# Patient Record
Sex: FEMALE | Race: WHITE | NOT HISPANIC OR LATINO | Employment: OTHER | URBAN - METROPOLITAN AREA
[De-identification: names, ages, dates, MRNs, and addresses within clinical notes are randomized per-mention and may not be internally consistent; named-entity substitution may affect disease eponyms.]

---

## 2017-01-24 ENCOUNTER — OFFICE VISIT (OUTPATIENT)
Dept: OBSTETRICS AND GYNECOLOGY | Facility: CLINIC | Age: 57
End: 2017-01-24
Payer: MEDICARE

## 2017-01-24 VITALS
SYSTOLIC BLOOD PRESSURE: 142 MMHG | DIASTOLIC BLOOD PRESSURE: 98 MMHG | BODY MASS INDEX: 44.41 KG/M2 | WEIGHT: 293 LBS | HEIGHT: 68 IN

## 2017-01-24 DIAGNOSIS — N95.0 POST-MENOPAUSAL BLEEDING: Primary | ICD-10-CM

## 2017-01-24 PROCEDURE — 99499 UNLISTED E&M SERVICE: CPT | Mod: S$GLB,,, | Performed by: OBSTETRICS & GYNECOLOGY

## 2017-01-24 PROCEDURE — 88305 TISSUE EXAM BY PATHOLOGIST: CPT | Mod: 26,,, | Performed by: PATHOLOGY

## 2017-01-24 PROCEDURE — 99999 PR PBB SHADOW E&M-EST. PATIENT-LVL III: CPT | Mod: PBBFAC,,, | Performed by: OBSTETRICS & GYNECOLOGY

## 2017-01-24 PROCEDURE — 88305 TISSUE EXAM BY PATHOLOGIST: CPT | Performed by: PATHOLOGY

## 2017-01-24 PROCEDURE — 58100 BIOPSY OF UTERUS LINING: CPT | Mod: S$GLB,,, | Performed by: OBSTETRICS & GYNECOLOGY

## 2017-01-24 RX ORDER — ALBUTEROL SULFATE 90 UG/1
2 AEROSOL, METERED RESPIRATORY (INHALATION)
COMMUNITY
Start: 2016-10-26

## 2017-01-24 RX ORDER — LUBIPROSTONE 8 UG/1
CAPSULE ORAL
COMMUNITY
Start: 2016-12-29

## 2017-01-24 RX ORDER — OXYCODONE AND ACETAMINOPHEN 10; 325 MG/1; MG/1
1 TABLET ORAL
COMMUNITY
Start: 2015-02-20

## 2017-01-24 RX ORDER — LEVOTHYROXINE SODIUM 125 UG/1
125 TABLET ORAL
COMMUNITY
Start: 2017-01-17

## 2017-01-24 RX ORDER — MORPHINE SULFATE 15 MG/1
15 TABLET, FILM COATED, EXTENDED RELEASE ORAL
COMMUNITY
End: 2021-08-05

## 2017-01-24 NOTE — PROGRESS NOTES
"Subjective:       Patient ID: Saida Guzman is a 56 y.o. female.    Chief Complaint:  Follow-up      History of Present Illness  HPI  here for f/u   Hx of sono 2016--ut 11x5x8; lining thickened 1.8 cm; had hysteroscopy with myosure 2016--showing large endometrial polyp with complex hyperplasia with atypia--atrophic endometrium other than polyp  Had no further bleeding until 2016  Unable to hold full bladder in order to do pelvic sono  Still with intermittent bleeding  "spots" --not really a flow--usually a few times in a day but not multiple days in a row  Can be 2-3 times /month  Agreeable to doing emb today    GYN & OB History  No LMP recorded. Patient is postmenopausal.   Date of Last Pap: No result found    OB History    Para Term  AB SAB TAB Ectopic Multiple Living   2 2 2             # Outcome Date GA Lbr Devonte/2nd Weight Sex Delivery Anes PTL Lv   2 Term      Vag-Spont      1 Term      Vag-Spont             Review of Systems  Review of Systems   Constitutional: Negative for activity change, appetite change, chills, diaphoresis, fatigue, fever and unexpected weight change.   HENT: Negative for mouth sores and tinnitus.    Eyes: Negative for discharge and visual disturbance.   Respiratory: Negative for cough, shortness of breath and wheezing.    Cardiovascular: Negative for chest pain, palpitations and leg swelling.   Gastrointestinal: Negative for abdominal pain, bloating, blood in stool, constipation, diarrhea, nausea and vomiting.   Endocrine: Negative for diabetes, hair loss, hot flashes, hyperthyroidism and hypothyroidism.   Genitourinary: Positive for postmenopausal bleeding. Negative for decreased libido, dyspareunia, dysuria, flank pain, frequency, genital sores, hematuria, menorrhagia, menstrual problem, pelvic pain, urgency, vaginal bleeding, vaginal discharge, vaginal pain, dysmenorrhea, urinary incontinence, postcoital bleeding and vaginal odor.   Musculoskeletal: Negative " for back pain and myalgias.   Skin:  Negative for rash, no acne and hair changes.   Neurological: Negative for seizures, syncope, numbness and headaches.   Hematological: Negative for adenopathy. Does not bruise/bleed easily.   Psychiatric/Behavioral: Negative for depression and sleep disturbance. The patient is not nervous/anxious.    Breast: Negative for breast mass, breast pain, nipple discharge and skin changes          Objective:    Physical Exam:   Constitutional: She appears well-developed.     Eyes: Conjunctivae and EOM are normal. Pupils are equal, round, and reactive to light.    Neck: Normal range of motion. Neck supple.     Pulmonary/Chest: Effort normal. Right breast exhibits no mass, no nipple discharge, no skin change, no tenderness and presence. Left breast exhibits no mass, no nipple discharge, no skin change, no tenderness and presence. Breasts are symmetrical.        Abdominal: Soft.     Genitourinary: Vagina normal and uterus normal. Pelvic exam was performed with patient supine. Cervix is normal. Right adnexum displays no mass. Left adnexum displays no mass.        Uterus Size: 10 cm   Musculoskeletal: Normal range of motion.       Neurological: She is alert.    Skin: Skin is warm.    Psychiatric: She has a normal mood and affect.          Assessment:   Post menopausal bleeding          Plan:      emb--see procedure note

## 2017-01-25 NOTE — PROCEDURES
Biopsy (Gynecological)  Date/Time: 2017 3:00 PM  Performed by: STEWART AMAYA.  Authorized by: STEWART AMAYA     Consent Done?:  Yes (Verbal)   Patient was prepped and draped in the normal sterile fashion.  Local anesthesia used?: No      Biopsy Location:  Uterus  Estimated blood loss (cc):  5   Patient tolerated the procedure well with no immediate complications.     CC: ENDOMETRIAL BIOPSPY    Saida Guzman is a 56 y.o. female  presents for an endometrial biopsy secondary to post menopausal bleeding  UPT was not indicated due to age    PRE-ENDOMETRIAL BIOPSY COUNSELING:    The patient was informed of the risk of bleeding, infection, uterine perforation and pain and that the test will rule-out endometrial cancer with accuracy greater than 95%.  She was counseled on the alternatives to endometrial biopsy and agrees to proceed.      TIME OUT PERFORMED.    The cervix was visualized with a speculum. Betadine applied x 3.    A single tooth tenaculum was placed on the anterior lip prior to the biopsy.      A sterile endometrial pipelle was passed without difficulty to a depth of 10 cm.    Endometrial tissue was obtained.      The specimen was placed in formalyn and sent to Pathology of histology evaluation.    The patient tolerated the procedure well.      ASSESSMENT AND PLAN  Post-menopausal bleeding  (primary encounter diagnosis)  Plan: Tissue Specimen To Pathology,         Obstetrics/Gynecology      POST ENDOMETRIAL BIOPSY COUNSELING:  Manage post biopsy cramping with NSAIDs or Tylenol.  Expect spotting or light bleeding for a few days.  Report bleeding heavier than a period, fever > 101.0 F, worsening pain or a foul smelling vaginal discharge.      Counseling lasted approximately 15 minutes and all her questions were answered.      FOLLOW-UP:  Pending biopsy

## 2017-01-30 ENCOUNTER — TELEPHONE (OUTPATIENT)
Dept: OBSTETRICS AND GYNECOLOGY | Facility: CLINIC | Age: 57
End: 2017-01-30

## 2017-01-30 DIAGNOSIS — N95.0 POSTMENOPAUSAL BLEEDING: Primary | ICD-10-CM

## 2017-01-30 RX ORDER — MEGESTROL ACETATE 40 MG/1
40 TABLET ORAL DAILY
Qty: 30 TABLET | Refills: 3 | Status: SHIPPED | OUTPATIENT
Start: 2017-01-30 | End: 2019-03-28

## 2017-01-30 NOTE — TELEPHONE ENCOUNTER
Results given to pt--emb--complex hyperplasia without atypia; pt offered megace for 90 d and repeat emb in 90d; pt aware that she may have intermittent bleeding during the 3 months of megace  rx sent to [facility]--susan doe    Please call pt with 3 mo emb appt

## 2017-02-10 ENCOUNTER — TELEPHONE (OUTPATIENT)
Dept: OBSTETRICS AND GYNECOLOGY | Facility: CLINIC | Age: 57
End: 2017-02-10

## 2017-02-10 NOTE — TELEPHONE ENCOUNTER
"Please advise--"good for her!!" She will be evaluated by anesthesia and may see the cardiologist preop  "

## 2017-02-10 NOTE — TELEPHONE ENCOUNTER
----- Message from Julia Holland sent at 2/10/2017 12:05 PM CST -----  Contact: Pt   Pt called and stated she needed to speak to the nurse. She stated that it is very personal and did not discuss details. She can be reached at [phone].    Thanks,  TF

## 2017-02-10 NOTE — TELEPHONE ENCOUNTER
----- Message from Julia Holland sent at 2/10/2017 12:05 PM CST -----  Contact: Pt   Pt called and stated she needed to speak to the nurse. She stated that it is very personal and did not discuss details. She can be reached at [phone].    Thanks,  TF

## 2017-03-06 ENCOUNTER — TELEPHONE (OUTPATIENT)
Dept: OBSTETRICS AND GYNECOLOGY | Facility: CLINIC | Age: 57
End: 2017-03-06

## 2017-03-06 NOTE — TELEPHONE ENCOUNTER
----- Message from Jose Patiño sent at 3/6/2017  1:36 PM CST -----  Contact: pt   States she is calling rg wanting to speak with Dr/ Nurse rg having surgery but is spotting again and can be reached at [phone]//thanks/dbw

## 2017-03-07 NOTE — TELEPHONE ENCOUNTER
Please schedule pt for emb procedure--  Please remind pt to premedicate with nsaid prior to procedure.

## 2017-03-30 ENCOUNTER — PROCEDURE VISIT (OUTPATIENT)
Dept: OBSTETRICS AND GYNECOLOGY | Facility: CLINIC | Age: 57
End: 2017-03-30
Payer: MEDICARE

## 2017-03-30 DIAGNOSIS — N85.01 COMPLEX ENDOMETRIAL HYPERPLASIA WITHOUT ATYPIA: Primary | ICD-10-CM

## 2017-03-30 PROCEDURE — 58100 BIOPSY OF UTERUS LINING: CPT | Mod: S$GLB,,, | Performed by: OBSTETRICS & GYNECOLOGY

## 2017-03-30 PROCEDURE — 88305 TISSUE EXAM BY PATHOLOGIST: CPT | Performed by: PATHOLOGY

## 2017-03-30 PROCEDURE — 88305 TISSUE EXAM BY PATHOLOGIST: CPT | Mod: 26,,, | Performed by: PATHOLOGY

## 2017-03-30 NOTE — PROCEDURES
Procedures   Pt has almost finished 90 days of megace--has occas spotting.  Here for f/u emb  Endometrial Biopsy Procedure Note    Name of Provider: Dr. Yamilka Montes  Procedure Details  Urine pregnancy test not done (post menopausal)  The risks (including infection, bleeding, pain, and uterine perforation) and benefits of the procedure were explained to the patient and Verbal informed consent was obtained.  Time out was performed.    The patient was placed in the dorsal lithotomy position.    A large Graves' speculum inserted in the vagina, and the cervix prepped with povidone iodine.       A sharp tenaculum was applied to the anterior lip of the cervix for stabilization.  A Pipelle endometrial aspirator was used to sound the uterus to a depth of 12.  It was then used to sample the endometrium.  3passes were made.  Sample was sent for pathologic examination.    Condition:  Stable    Complications:  None    Plan:    The patient was advised to call for any fever or for prolonged or severe pain or bleeding. She was advised to use OTC acetaminophen as needed for mild to moderate pain. She was advised to avoid vaginal intercourse for 48 hours or until the bleeding has completely stopped.      Specimen: Endometrial curettings    Post Op Diagnosis: endometrial hyperplasia without atypia

## 2017-04-17 ENCOUNTER — TELEPHONE (OUTPATIENT)
Dept: OBSTETRICS AND GYNECOLOGY | Facility: CLINIC | Age: 57
End: 2017-04-17

## 2017-04-17 DIAGNOSIS — C54.1 ENDOMETRIAL CANCER: Primary | ICD-10-CM

## 2017-04-19 ENCOUNTER — TELEPHONE (OUTPATIENT)
Dept: OBSTETRICS AND GYNECOLOGY | Facility: CLINIC | Age: 57
End: 2017-04-19

## 2017-04-19 NOTE — TELEPHONE ENCOUNTER
Pt states she loves Dr Montes dearly but she is unable to drive to [place] financially. She is inquiring about a local gyn oncology provider. I explained to Michelle Thomas that I will run this by Dr. Montes and get back with her on the next steps. TAM Kelly

## 2017-04-19 NOTE — TELEPHONE ENCOUNTER
----- Message from Stephanie Hughes sent at 4/18/2017  2:19 PM CDT -----  Contact: Patient  Saida, patient [phone], Calling to speak with the nurse or doctor about recent results and her care options. Please advise Thanks.

## 2017-05-01 ENCOUNTER — TELEPHONE (OUTPATIENT)
Dept: OBSTETRICS AND GYNECOLOGY | Facility: CLINIC | Age: 57
End: 2017-05-01

## 2017-05-01 DIAGNOSIS — C54.1 ENDOMETRIAL CANCER: Primary | ICD-10-CM
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
## 2017-05-01 NOTE — TELEPHONE ENCOUNTER
----- Message from Lilliana Palomo LPN sent at 5/1/2017 12:08 PM CDT -----  Contact: [facility]  Dr. Montes:    Can you do an external referral for this patient?  Lilliana  ----- Message -----     From: Tiffanie Mann     Sent: 5/1/2017   9:27 AM       To: Simon Prather Staff    Patient wants a referral to [facility], Please fax to [phone] or call at [phone] ext [phone].    Thanks  Td

## 2017-05-02 ENCOUNTER — TELEPHONE (OUTPATIENT)
Dept: OBSTETRICS AND GYNECOLOGY | Facility: CLINIC | Age: 57
End: 2017-05-02

## 2017-05-02 NOTE — TELEPHONE ENCOUNTER
----- Message from Marquita Muller sent at 5/1/2017 12:05 PM CDT -----  She states that she requested a referral to Dr Mendoza at [facility], but she has not received it.  She wants to know if it has been done.   Call her at [phone].                                   rosenthal

## 2017-05-04 ENCOUNTER — TELEPHONE (OUTPATIENT)
Dept: OBSTETRICS AND GYNECOLOGY | Facility: CLINIC | Age: 57
End: 2017-05-04

## 2017-05-04 NOTE — TELEPHONE ENCOUNTER
Returned call to Pearl with [facility] and was told that she was on the other line. Left message for her to call the office back.

## 2017-05-04 NOTE — TELEPHONE ENCOUNTER
----- Message from Cathleen Mcclain sent at 5/3/2017 10:17 AM CDT -----  Contact: [facility] in [place]   [facility] in [place]  [phone] ask Pearl ,,, calling about a referral has been done for [facility] for Dr. Stark,,,

## 2017-05-23 DIAGNOSIS — N95.0 POSTMENOPAUSAL BLEEDING: ICD-10-CM

## 2017-05-23 RX ORDER — MEGESTROL ACETATE 40 MG/1
TABLET ORAL
Qty: 30 TABLET | Refills: 0 | OUTPATIENT
Start: 2017-05-23

## 2019-03-28 ENCOUNTER — OFFICE VISIT (OUTPATIENT)
Dept: UROLOGY | Facility: CLINIC | Age: 59
End: 2019-03-28
Payer: MEDICARE

## 2019-03-28 VITALS — BODY MASS INDEX: 44.41 KG/M2 | WEIGHT: 293 LBS | HEIGHT: 68 IN

## 2019-03-28 DIAGNOSIS — R32 URINARY INCONTINENCE, UNSPECIFIED TYPE: Primary | ICD-10-CM

## 2019-03-28 DIAGNOSIS — N20.0 RENAL STONE: ICD-10-CM

## 2019-03-28 DIAGNOSIS — K59.00 CONSTIPATION, UNSPECIFIED CONSTIPATION TYPE: ICD-10-CM

## 2019-03-28 LAB
BACTERIA #/AREA URNS AUTO: NORMAL /HPF
BILIRUB SERPL-MCNC: NORMAL MG/DL
BLOOD URINE, POC: NORMAL
COLOR, POC UA: YELLOW
GLUCOSE UR QL STRIP: NORMAL
HYALINE CASTS UR QL AUTO: 0 /LPF
KETONES UR QL STRIP: NORMAL
LEUKOCYTE ESTERASE URINE, POC: NORMAL
MICROSCOPIC COMMENT: NORMAL
NITRITE, POC UA: NORMAL
PH, POC UA: 5
PROTEIN, POC: NORMAL
RBC #/AREA URNS AUTO: 0 /HPF (ref 0–4)
SPECIFIC GRAVITY, POC UA: 1.02
SQUAMOUS #/AREA URNS AUTO: 3 /HPF
UROBILINOGEN, POC UA: NORMAL
WBC #/AREA URNS AUTO: 1 /HPF (ref 0–5)

## 2019-03-28 PROCEDURE — 3008F BODY MASS INDEX DOCD: CPT | Mod: CPTII,S$GLB,, | Performed by: UROLOGY

## 2019-03-28 PROCEDURE — 81002 POCT URINE DIPSTICK WITHOUT MICROSCOPE: ICD-10-PCS | Mod: S$GLB,,, | Performed by: UROLOGY

## 2019-03-28 PROCEDURE — 81002 URINALYSIS NONAUTO W/O SCOPE: CPT | Mod: S$GLB,,, | Performed by: UROLOGY

## 2019-03-28 PROCEDURE — 81001 URINALYSIS AUTO W/SCOPE: CPT

## 2019-03-28 PROCEDURE — 99204 OFFICE O/P NEW MOD 45 MIN: CPT | Mod: 25,S$GLB,, | Performed by: UROLOGY

## 2019-03-28 PROCEDURE — 87086 URINE CULTURE/COLONY COUNT: CPT

## 2019-03-28 PROCEDURE — 99999 PR PBB SHADOW E&M-EST. PATIENT-LVL III: ICD-10-PCS | Mod: PBBFAC,,, | Performed by: UROLOGY

## 2019-03-28 PROCEDURE — 99204 PR OFFICE/OUTPT VISIT, NEW, LEVL IV, 45-59 MIN: ICD-10-PCS | Mod: 25,S$GLB,, | Performed by: UROLOGY

## 2019-03-28 PROCEDURE — 3008F PR BODY MASS INDEX (BMI) DOCUMENTED: ICD-10-PCS | Mod: CPTII,S$GLB,, | Performed by: UROLOGY

## 2019-03-28 PROCEDURE — 99999 PR PBB SHADOW E&M-EST. PATIENT-LVL III: CPT | Mod: PBBFAC,,, | Performed by: UROLOGY

## 2019-03-28 RX ORDER — AMLODIPINE BESYLATE 5 MG/1
5 TABLET ORAL
COMMUNITY
Start: 2019-03-13

## 2019-03-28 RX ORDER — FLUTICASONE PROPIONATE 50 MCG
2 SPRAY, SUSPENSION (ML) NASAL
COMMUNITY
Start: 2019-01-10 | End: 2020-01-10

## 2019-03-28 RX ORDER — NAPROXEN 500 MG/1
500 TABLET ORAL
COMMUNITY
Start: 2018-11-16 | End: 2019-11-16

## 2019-03-28 RX ORDER — TIZANIDINE 2 MG/1
2 TABLET ORAL
COMMUNITY
Start: 2018-09-17
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
## 2019-03-30 LAB — BACTERIA UR CULT: NO GROWTH

## 2019-04-04 ENCOUNTER — TELEPHONE (OUTPATIENT)
Dept: UROLOGY | Facility: CLINIC | Age: 59
End: 2019-04-04

## 2019-04-04 ENCOUNTER — HOSPITAL ENCOUNTER (OUTPATIENT)
Dept: RADIOLOGY | Facility: HOSPITAL | Age: 59
Discharge: HOME | End: 2019-04-04
Attending: UROLOGY
Payer: MEDICARE

## 2019-04-04 DIAGNOSIS — K59.00 CONSTIPATION, UNSPECIFIED CONSTIPATION TYPE: ICD-10-CM

## 2019-04-04 DIAGNOSIS — R32 URINARY INCONTINENCE, UNSPECIFIED TYPE: ICD-10-CM

## 2019-04-04 DIAGNOSIS — N20.0 RENAL STONE: ICD-10-CM

## 2019-04-04 PROCEDURE — 74176 CT ABD & PELVIS W/O CONTRAST: CPT | Mod: TC

## 2019-04-04 NOTE — TELEPHONE ENCOUNTER
----- Message from Senia Epperson sent at 4/4/2019 12:30 PM CDT -----  Contact: Patient  Patient called to speak with the nurse; she stated she just had a CT (from the neck to her hips) done at [facility] about 3 weeks ago. She wants to know if the results from that scan be obtained or if she really has to do the CT that Dr. Stewart scheduled for her today. She can be contacted at [phone].    Thanks,  Senia

## 2019-04-04 NOTE — TELEPHONE ENCOUNTER
Patient states she is scheduled for CT RSS today, however, she had a CT from her neck to her pubic area 3 weeks ago at [facility] and wants to know if she should have the CT RSS today. Patient doesn't know exactly what type of CT scan was preformed. Discussed with Dr. Stewart who recommends patient have CT RSS today as scheduled. Patient notified and states understanding.

## 2019-05-27 ENCOUNTER — OFFICE VISIT (OUTPATIENT)
Dept: UROLOGY | Facility: CLINIC | Age: 59
End: 2019-05-27
Payer: MEDICARE

## 2019-05-27 VITALS
SYSTOLIC BLOOD PRESSURE: 129 MMHG | HEART RATE: 80 BPM | DIASTOLIC BLOOD PRESSURE: 76 MMHG | WEIGHT: 293 LBS | BODY MASS INDEX: 58.63 KG/M2

## 2019-05-27 DIAGNOSIS — N39.41 URGE INCONTINENCE: Primary | ICD-10-CM

## 2019-05-27 DIAGNOSIS — G89.29 CHRONIC BILATERAL LOW BACK PAIN WITHOUT SCIATICA: ICD-10-CM

## 2019-05-27 DIAGNOSIS — Z85.42 HISTORY OF ENDOMETRIAL CANCER: ICD-10-CM

## 2019-05-27 DIAGNOSIS — E66.01 MORBID OBESITY: ICD-10-CM

## 2019-05-27 DIAGNOSIS — M54.50 CHRONIC BILATERAL LOW BACK PAIN WITHOUT SCIATICA: ICD-10-CM

## 2019-05-27 PROCEDURE — 81002 URINALYSIS NONAUTO W/O SCOPE: CPT | Mod: S$GLB,,, | Performed by: UROLOGY

## 2019-05-27 PROCEDURE — 3008F BODY MASS INDEX DOCD: CPT | Mod: CPTII,S$GLB,, | Performed by: UROLOGY

## 2019-05-27 PROCEDURE — 51701 INSERT BLADDER CATHETER: CPT | Mod: S$GLB,,, | Performed by: UROLOGY

## 2019-05-27 PROCEDURE — 51701 PR INSERTION OF NON-INDWELLING BLADDER CATHETERIZATION FOR RESIDUAL UR: ICD-10-PCS | Mod: S$GLB,,, | Performed by: UROLOGY

## 2019-05-27 PROCEDURE — 99215 PR OFFICE/OUTPT VISIT, EST, LEVL V, 40-54 MIN: ICD-10-PCS | Mod: 25,S$GLB,, | Performed by: UROLOGY

## 2019-05-27 PROCEDURE — 99999 PR PBB SHADOW E&M-EST. PATIENT-LVL III: CPT | Mod: PBBFAC,,, | Performed by: UROLOGY

## 2019-05-27 PROCEDURE — 87086 URINE CULTURE/COLONY COUNT: CPT

## 2019-05-27 PROCEDURE — 99999 PR PBB SHADOW E&M-EST. PATIENT-LVL III: ICD-10-PCS | Mod: PBBFAC,,, | Performed by: UROLOGY

## 2019-05-27 PROCEDURE — 99215 OFFICE O/P EST HI 40 MIN: CPT | Mod: 25,S$GLB,, | Performed by: UROLOGY

## 2019-05-27 PROCEDURE — 81002 PR URINALYSIS NONAUTO W/O SCOPE: ICD-10-PCS | Mod: S$GLB,,, | Performed by: UROLOGY

## 2019-05-27 PROCEDURE — 3008F PR BODY MASS INDEX (BMI) DOCUMENTED: ICD-10-PCS | Mod: CPTII,S$GLB,, | Performed by: UROLOGY
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
## 2019-05-28 LAB — BACTERIA UR CULT: NO GROWTH

## 2019-06-12 ENCOUNTER — TELEPHONE (OUTPATIENT)
Dept: UROLOGY | Facility: CLINIC | Age: 59
End: 2019-06-12

## 2019-06-12 NOTE — LETTER
June 12, 2019    Saida Simpson  [address] [place] [address]             [facility] 4th [facility]  [address]  [facility] [address]  Phone: [phone] Dear Mrs. Thomas Simpson:    I received the urodynamic report from Dr. Rascon's office and while it was done well, the infusion rate was rather fast.  In addition, I would like to look in your bladder to be assured that there is no other pathology given that you have had radiation therapy.  I tried calling your number but your message indicated a malfunctioning of your phone.  If you could call my , Edgardo at [phone], she can schedule the appropriate tests.      If you have any questions or concerns, please don't hesitate to call.    Sincerely,        Arely Reynaga MD

## 2019-06-12 NOTE — TELEPHONE ENCOUNTER
The urodynmics were done at a rate of 50 ml/sec infusion.  She was found to have a small volume bladder with urgency at 216 ml.  Given her history of radiation therapy to the pelvis, I would like to do my own FUDS with a cystoscopy.      Tried calling her but the message on her phone stated that her phone is malfunctioning.  She has not set a My Chart account.

## 2019-06-13 ENCOUNTER — TELEPHONE (OUTPATIENT)
Dept: UROLOGY | Facility: CLINIC | Age: 59
End: 2019-06-13

## 2019-06-13 DIAGNOSIS — R32 URINARY INCONTINENCE, UNSPECIFIED TYPE: ICD-10-CM

## 2019-06-13 DIAGNOSIS — N39.41 URGE INCONTINENCE: Primary | ICD-10-CM

## 2019-06-13 NOTE — TELEPHONE ENCOUNTER
----- Message from Cathleen Boothe LPN sent at 6/13/2019  9:36 AM CDT -----  Contact: pt   Pt calling for FUDS/cysto. This is the one Juhi sent letter to on yesterday.    ----- Message -----  From: Rafaela Garcia  Sent: 6/13/2019   9:20 AM  To: Juhi Mcgee Staff    Patient Returning Call from [facility]    Who Left Message for Patient: Dr. Reynaga  Communication Preference: [phone]  Additional Information:

## 2019-06-13 NOTE — TELEPHONE ENCOUNTER
Spoke to pt. FUDS cysto scheduled for 7/16. Appt letter mailed to pt. Pt verbalized understanding.

## 2019-07-15 ENCOUNTER — TELEPHONE (OUTPATIENT)
Dept: UROLOGY | Facility: CLINIC | Age: 59
End: 2019-07-15

## 2019-07-16 ENCOUNTER — HOSPITAL ENCOUNTER (OUTPATIENT)
Facility: HOSPITAL | Age: 59
Discharge: HOME | End: 2019-07-16
Attending: UROLOGY | Admitting: UROLOGY
Payer: MEDICARE

## 2019-07-16 VITALS
TEMPERATURE: 98 F | SYSTOLIC BLOOD PRESSURE: 120 MMHG | DIASTOLIC BLOOD PRESSURE: 79 MMHG | HEART RATE: 77 BPM | WEIGHT: 293 LBS | HEIGHT: 68 IN | BODY MASS INDEX: 44.41 KG/M2 | OXYGEN SATURATION: 96 % | RESPIRATION RATE: 20 BRPM

## 2019-07-16 DIAGNOSIS — N39.498 OTHER URINARY INCONTINENCE: ICD-10-CM

## 2019-07-16 PROBLEM — R32 ABSENCE OF BLADDER CONTINENCE: Status: ACTIVE | Noted: 2019-07-16

## 2019-07-16 PROCEDURE — 27200973 HC CYSTO SUPPLY IV (URODYNAMICS): Performed by: UROLOGY

## 2019-07-16 PROCEDURE — 36000704 HC OR TIME LEV I 1ST 15 MIN: Performed by: UROLOGY

## 2019-07-16 PROCEDURE — 51728 CYSTOMETROGRAM W/VP: CPT | Mod: 26,,, | Performed by: UROLOGY

## 2019-07-16 PROCEDURE — 51784 ANAL/URINARY MUSCLE STUDY: CPT | Mod: 26,51,, | Performed by: UROLOGY

## 2019-07-16 PROCEDURE — 76000 FLUOROSCOPY <1 HR PHYS/QHP: CPT | Mod: 26,,, | Performed by: UROLOGY

## 2019-07-16 PROCEDURE — 76000 PR  FLUOROSCOPE EXAMINATION: ICD-10-PCS | Mod: 26,,, | Performed by: UROLOGY

## 2019-07-16 PROCEDURE — 36000705 HC OR TIME LEV I EA ADD 15 MIN: Performed by: UROLOGY

## 2019-07-16 PROCEDURE — 51784 PR ANAL/URINARY MUSCLE STUDY: ICD-10-PCS | Mod: 26,51,, | Performed by: UROLOGY

## 2019-07-16 PROCEDURE — 51741 PR UROFLOWMETRY, COMPLEX: ICD-10-PCS | Mod: 26,51,, | Performed by: UROLOGY

## 2019-07-16 PROCEDURE — 51797 PR VOIDING PRESS STUDY INTRA-ABDOMINAL VOID: ICD-10-PCS | Mod: 26,,, | Performed by: UROLOGY

## 2019-07-16 PROCEDURE — 51728 PR COMPLEX CYSTOMETROGRAM VOIDING PRESSURE STUDIES: ICD-10-PCS | Mod: 26,,, | Performed by: UROLOGY

## 2019-07-16 PROCEDURE — 51797 INTRAABDOMINAL PRESSURE TEST: CPT | Mod: 26,,, | Performed by: UROLOGY

## 2019-07-16 PROCEDURE — 51741 ELECTRO-UROFLOWMETRY FIRST: CPT | Mod: 26,51,, | Performed by: UROLOGY
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
## 2019-07-16 NOTE — SUBJECTIVE & OBJECTIVE
Past Medical History:   Diagnosis Date    Back pain     Endometrial cancer     Hypertension        Past Surgical History:   Procedure Laterality Date    BREAST SURGERY      RADICAL HYSTERECTOMY      TUBAL LIGATION         Review of patient's allergies indicates:   Allergen Reactions    Adhesive Itching     Pt reports the adhesive on the fentanyl patch caused severe itching; pt states not allergic to any other type of fentanyl routes    Vancomycin analogues Rash       Family History     None          Tobacco Use    Smoking status: Current Every Day Smoker     Packs/day: 0.50   Substance and Sexual Activity    Alcohol use: No    Drug use: No    Sexual activity: Never       Review of Systems   Constitutional: Negative.    HENT: Negative.    Eyes: Negative.    Respiratory: Negative.    Cardiovascular: Negative.    Gastrointestinal: Negative.    Genitourinary: Positive for frequency and urgency.        Urge incontinence   Musculoskeletal: Positive for back pain.   Skin: Negative.    Neurological: Negative.    Psychiatric/Behavioral: Negative.        Objective:     Temp:  [98.2 °F (36.8 °C)] 98.2 °F (36.8 °C)  Pulse:  [77] 77  Resp:  [20] 20  SpO2:  [96 %] 96 %  BP: (120)/(79) 120/79     Body mass index is 57.78 kg/m².    No intake/output data recorded.       Drains          None          Physical Exam   Constitutional: She is oriented to person, place, and time. She appears well-developed and well-nourished.   Morbidly obese   HENT:   Head: Normocephalic and atraumatic.   Eyes: Conjunctivae are normal.   Neck: Normal range of motion.   Pulmonary/Chest: Effort normal.   Abdominal: Soft.   Musculoskeletal: Normal range of motion.   Neurological: She is alert and oriented to person, place, and time.   Skin: Skin is warm and dry.     Psychiatric: She has a normal mood and affect. Her behavior is normal. Judgment and thought content normal.       Significant Labs:    BMP:  No results for input(s): NA, K, CL, CO2,  BUN, CREATININE, LABGLOM, GLUCOSE, CALCIUM in the last 168 hours.    CBC:  No results for input(s): WBC, HGB, HCT, PLT in the last 168 hours.

## 2019-07-21 NOTE — OP NOTE
Date of Procedure:  7/16/2019    Fluoro Urodynamic Report    Indication:  Refractory urgency, frequency, urge incontinence in a patient with history of chronic back pain, endometrial cancer, status post radical hysterectomy, XRT and chemotherapy    Patient was taken to the Urodynamic Suite with a comfortably full bladder and asked to perform a free uroflow.  Next, the patient was prepped and the urinary residual was drained with a 14 Fr catheter.  A 7 Fr dual lumen catheter was placed to measure intravesical pressures.  A 10 Fr balloon manometer was placed into the rectum for abdominal pressure measurements.  Patch EMG electrodes were placed on the perineum.  The patient was connected to the [facility] Urodynamic machine, using a multichannel technique.  The bladder was filled with Cysto ConRay at room temperature at a rate of 30 ml/min.  Patient is filled to urgency.  Filling is performed with the patient in the seated position.  Abdominal leak point pressures are checked at 1st desire, then serially at 50cc increments first with Valsalva then with coughing.  The patient was then asked to sit and void for a pressure flow study.    The following are the results of the study:  1.  Uroflow       Q max:  Could not void    2.  Amount in bladder:  60 ml    3.  CMG       Sensation:         First Desire:  54.8 ml         Normal Desire:  86.6 ml         Strong Desire:  106 ml         Urgency:  119.7 ml       Capacity:  171.8 ml       Abnormal Contractions:  none       Compliance:   normal    4.  Abdominal Leak Point Pressure:       Valsalva:  58 cm H2O at 108 ml infused       Cough:  Did not leak    5.  EMG:  Increased and did not change when voiding    6.  Voiding phase       Q max:  14.1 ml/sec       P det at Q max:  10.6 cm H2O       Pattern of the curve:  Intermittent, voided three separate times in an effort to empty her bladder       Voided volume:  121.8 ml       PVR:  50 ml    7.  Fluoroscopy:  Could not visualize due  to the thickness of her pannus, even with increasing the penetration of the Xray to extra large    8.  Analysis:  Very small capacity bladder, does not empty well, hypotonic.      9.  Recommendations:       A.  Difficult case.  Suprapubic catheter may be the only viable option and even then, she is at increased risk of infection due to the size of the pannus.         B.  Will need to discuss further, as I was called to the OR.

## 2019-08-12 ENCOUNTER — TELEPHONE (OUTPATIENT)
Dept: UROLOGY | Facility: CLINIC | Age: 59
End: 2019-08-12

## 2019-08-12 DIAGNOSIS — N39.41 URGE INCONTINENCE: Primary | ICD-10-CM

## 2019-08-12 NOTE — TELEPHONE ENCOUNTER
Pt states she has placed herself on routine of going to the bathroom every 1 - 1.5 hours during the day and has had some improvement. However, at night, her incontinence is much worse. States she has been wearing padding of 3 adult diapers in her underwear and a bed pad on bed but continues saturating her bed. Asking for advice.     Notified that provider is out this week, will wait for response upon provider's return.

## 2019-08-12 NOTE — TELEPHONE ENCOUNTER
----- Message from Izzy Mcnamara sent at 8/12/2019 12:54 PM CDT -----  Contact: pt#[phone]  Needs Advice    Reason for call:pt wants to speak with you about frequency and incontinence issues         Communication Preference:call    Additional Information:

## 2019-08-19 RX ORDER — DARIFENACIN 15 MG/1
15 TABLET, EXTENDED RELEASE ORAL DAILY
Qty: 30 TABLET | Refills: 11 | Status: SHIPPED | OUTPATIENT
Start: 2019-08-19 | End: 2020-05-19

## 2019-08-20 NOTE — TELEPHONE ENCOUNTER
Called the patient.  Discussed the results of the FUDS.  Because she has a history of radiation therapy and the back injury (has a history of spinal cord injury involving L1-L5), and a spinal stimulator for the pain as well as XRT for endometrial cancer.      She has a diminished bladder capacity (max cystometric capacity of 171 ml and a PVR of 50 ml) discussed that this is a very tough situation.  She has had Botox x 2 and has been tried on oxybutynin and mirabegron.     It has not been my experience that InterStim will help with this situation especially in view of the XRT.  Could try another antimuscarinic to see if she has improvement.  Darifenacin was sent to her preferred pharmacy.  She will let me know how this works.

## 2019-08-28 ENCOUNTER — TELEPHONE (OUTPATIENT)
Dept: UROLOGY | Facility: CLINIC | Age: 59
End: 2019-08-28

## 2019-08-28 NOTE — TELEPHONE ENCOUNTER
----- Message from Isabela Milner sent at 8/28/2019 10:47 AM CDT -----  Pt called to let Dr. Reynaga know the medications is working for her and she wants to say thank you so much. She really appreciate it.

## 2019-10-07 ENCOUNTER — TELEPHONE (OUTPATIENT)
Dept: UROLOGY | Facility: CLINIC | Age: 59
End: 2019-10-07

## 2019-10-07 NOTE — TELEPHONE ENCOUNTER
----- Message from Laury Kelly sent at 10/7/2019  9:33 AM CDT -----  Contact: pt   Pt  Found out she has type 2 diabetic. Customer eating to late that is why she was pea all night long. Medicine is working for her.

## 2020-02-06 ENCOUNTER — TELEPHONE (OUTPATIENT)
Dept: UROLOGY | Facility: CLINIC | Age: 60
End: 2020-02-06
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
## 2020-02-06 NOTE — TELEPHONE ENCOUNTER
----- Message from Josefina Hilliard sent at 2/6/2020  1:50 PM CST -----  Contact: [phone]  Calling for prior authorization on Rx darifenacin (ENABLEX) 15 mg 24 hr tablet, no longer preferred drug with [facility]. Patient has two weeks of medication left. Patient has not been back in to office due to extreme pain. Please call      [id number] - [place] - [address] AT [facility] [address] [facility]  [address] [address]  Phone: [phone] Fax: [phone]

## 2020-02-12 ENCOUNTER — TELEPHONE (OUTPATIENT)
Dept: UROLOGY | Facility: CLINIC | Age: 60
End: 2020-02-12

## 2020-02-12 NOTE — TELEPHONE ENCOUNTER
----- Message from Josefina Hilliard sent at 2/12/2020 10:56 AM CST -----  Contact: [phone]  Calling to speak wit Dr Reynaga about Rxdarifenacin (ENABLEX) 15 mg 24 hr tablet. Patient has been taking more than dosage prescribed due to worsening symptoms at night. Please call

## 2020-02-13 ENCOUNTER — DOCUMENTATION ONLY (OUTPATIENT)
Dept: UROLOGY | Facility: CLINIC | Age: 60
End: 2020-02-13

## 2020-02-13 NOTE — PROGRESS NOTES
Fax received from [facility] requesting the same information on their form.  This was filled out and my original letter was attached, as well.

## 2020-04-20 ENCOUNTER — TELEPHONE (OUTPATIENT)
Dept: UROLOGY | Facility: CLINIC | Age: 60
End: 2020-04-20

## 2020-04-20 NOTE — TELEPHONE ENCOUNTER
"----- Message from Cielo Davidson MA sent at 4/20/2020  3:41 PM CDT -----  Contact: [phone]  pt would like to talk to Dr Reynaga about continuing her enablex during the day but at night using an external device that 'sucks the urine out" and can be obtained through [employer]. [phone]  "

## 2020-04-23 NOTE — TELEPHONE ENCOUNTER
[facility] states [facility] will [facility] signed script and will send to pt's insurance for an exception.     Script to be signed upon doctor's arrival.

## 2020-04-23 NOTE — TELEPHONE ENCOUNTER
----- Message from Rafaela Garcia sent at 4/23/2020  9:16 AM CDT -----  Lissa gibson/[facility] called for status fax request for supplies.  Call back [phone] and fax # [phone]

## 2020-05-01 NOTE — TELEPHONE ENCOUNTER
Called [employer] and discussed the Purewick system.  The documentation is exactly the same as it is with the straight catheter system.  The patient needs to be educated by me and proper documentation with a note needs to be done.     I called the patient and informed her that requested the information and will reach out to her for an appointment when that has been done.  She states that the nocturnal enuresis is better with the the Enablex but it is still present.  With her history of chronic lower back pain, endometrial cancer status post radical hysterectomy and XRT with chemo, she has a situation that is very intractable.  Will flag her chart to be called in once I have the information.

## 2020-05-19 ENCOUNTER — TELEPHONE (OUTPATIENT)
Dept: UROLOGY | Facility: CLINIC | Age: 60
End: 2020-05-19

## 2020-05-19 DIAGNOSIS — N39.41 URGE INCONTINENCE: ICD-10-CM

## 2020-05-19 RX ORDER — DARIFENACIN 15 MG/1
TABLET, EXTENDED RELEASE ORAL
Qty: 30 TABLET | Refills: 11 | Status: SHIPPED | OUTPATIENT
Start: 2020-05-19 | End: 2020-08-20

## 2020-08-20 ENCOUNTER — TELEPHONE (OUTPATIENT)
Dept: UROLOGY | Facility: CLINIC | Age: 60
End: 2020-08-20

## 2020-09-04 ENCOUNTER — OFFICE VISIT (OUTPATIENT)
Dept: UROLOGY | Facility: CLINIC | Age: 60
End: 2020-09-04
Payer: MEDICARE

## 2020-09-04 DIAGNOSIS — R35.0 INCREASED FREQUENCY OF URINATION: ICD-10-CM

## 2020-09-04 DIAGNOSIS — N39.41 URGE INCONTINENCE: ICD-10-CM

## 2020-09-04 DIAGNOSIS — R39.15 URINARY URGENCY: Primary | ICD-10-CM

## 2020-09-04 PROCEDURE — 99213 OFFICE O/P EST LOW 20 MIN: CPT | Mod: 95,,, | Performed by: UROLOGY

## 2020-09-04 PROCEDURE — 99213 PR OFFICE/OUTPT VISIT, EST, LEVL III, 20-29 MIN: ICD-10-PCS | Mod: 95,,, | Performed by: UROLOGY

## 2020-09-04 RX ORDER — DARIFENACIN 15 MG/1
15 TABLET, EXTENDED RELEASE ORAL DAILY
Qty: 90 TABLET | Refills: 11 | Status: SHIPPED | OUTPATIENT
Start: 2020-09-04 | End: 2021-06-24 | Stop reason: SDUPTHER

## 2020-09-04 NOTE — PROGRESS NOTES
The patient location is:  car  The chief complaint leading to consultation is:  Med refill    Visit type: audiovisual    Time with patient:  11 minutes of total time spent on the encounter, which includes face to face time and non-face to face time preparing to see the patient (eg, review of tests), obtaining and/or reviewing separately obtained history, documenting clinical information in the electronic or other health record, independently interpreting results (not separately reported) and communicating results to the patient/family/caregiver, or care coordination (not separately reported).     Each patient to whom he or she provides medical services by telemedicine is:  (1) informed of the relationship between the physician and patient and the respective role of any other health care provider with respect to management of the patient; and (2) notified that he or she may decline to receive medical services by telemedicine and may withdraw from such care at any time.      CHIEF COMPLAINT:    Mrs. Thomas Simpson is a 60 y.o. female presenting for a follow up on urgency, frequency, urge incontinence in a patient with history of chronic back pain, endometrial cancer, status post radical hysterectomy, XRT and chemotherapy    PRESENTING ILLNESS:    Saida Simpson is a 60 y.o. female who has a history as above.  She takes darifenacin 15 mg and has reasonable response.  She has undergone urodynamics and was found to have a small capacity bladder, with incomplete bladder emptying.  The bladder is thickened from the XRT.  As such, she is not a great candidate for tertiary therapy (cannot have botox due to the incomplete bladder emptying) and Sacral neuromodulation is unlikely to be effective given the bladder thickening.  She is managed relatively well with the darifenacin 15 mg.  She has now new diagnoses or surgeries.      We tried to get her approved for the Digly to use at night but insurance would  not cover it and the out of pocket cost was prohibitive for her.     REVIEW OF SYSTEMS:    Review of Systems   Constitutional: Negative.    HENT: Negative.    Eyes: Negative.    Respiratory: Negative.    Cardiovascular: Negative.    Gastrointestinal: Negative.    Genitourinary: Positive for frequency and urgency.   Musculoskeletal: Positive for back pain.   Skin: Negative.    Neurological: Negative.    Endo/Heme/Allergies: Negative.    Psychiatric/Behavioral: Negative.        PATIENT HISTORY:    Past Medical History:   Diagnosis Date    Back pain     Endometrial cancer     Hypertension        Past Surgical History:   Procedure Laterality Date    BREAST SURGERY      FLUOROSCOPIC URODYNAMIC STUDY N/A 7/16/2019    Procedure: URODYNAMIC STUDY, FLUOROSCOPIC;  Surgeon: Arely Reynaga MD;  Location: [facility] OR [address] [place] [address];  Service: Urology;  Laterality: N/A;  1 hour    RADICAL HYSTERECTOMY      TUBAL LIGATION         No family history on file.    Socioeconomic History    Marital status:    Tobacco Use    Smoking status: Current Every Day Smoker     Packs/day: 0.50   Substance and Sexual Activity    Alcohol use: No    Drug use: No    Sexual activity: Never       Allergies:  Adhesive and Vancomycin analogues    Medications:  Outpatient Encounter Medications as of 9/4/2020   Medication Sig Dispense Refill    albuterol 90 mcg/actuation inhaler Inhale 2 puffs into the lungs.      amLODIPine (NORVASC) 5 MG tablet Take 5 mg by mouth.      darifenacin (ENABLEX) 15 mg 24 hr tablet Take 1 tablet (15 mg total) by mouth once daily. 90 tablet 11    exenatide (BYETTA) 5 mcg/dose (250 mcg/mL) 1.2 mL injection Inject 5 mcg into the skin.      levothyroxine (SYNTHROID) 125 MCG tablet Take 125 mcg by mouth.      lisinopril 10 MG tablet Take 10 mg by mouth once daily.      lubiprostone (AMITIZA) 8 MCG Cap TAKE 1 CAPSULE BY MOUTH TWICE DAILY WITH MEALS      megestrol (MEGACE) 40 MG Tab Take 1 tablet (40 mg total) by  mouth once daily. 30 tablet 3    morphine (MS CONTIN) 15 MG 12 hr tablet Take 15 mg by mouth.      oxycodone-acetaminophen (PERCOCET)  mg per tablet Take 1 tablet by mouth.      tiZANidine (ZANAFLEX) 2 MG tablet Take 2 mg by mouth.      [DISCONTINUED] darifenacin (ENABLEX) 15 mg 24 hr tablet TAKE 1 TABLET BY MOUTH EVERY DAY 90 tablet 0     No facility-administered encounter medications on file as of 9/4/2020.          PHYSICAL EXAMINATION:    The patient generally appears in good health, is appropriately interactive, and is in no apparent distress.    Mental: Cooperative with normal affect.    Chest:  normal inspiratory effort.        LABS:    Lab Results   Component Value Date    BUN 13 05/27/2019    CREATININE 1.0 05/27/2019       IMPRESSION:    Encounter Diagnoses   Name Primary?    Urinary urgency Yes    Urge incontinence     Increased frequency of urination        PLAN:    1.  Refilled the darifenacin.  2.  Follow up in 1 year.

## 2021-05-10 ENCOUNTER — PATIENT MESSAGE (OUTPATIENT)
Dept: RESEARCH | Facility: HOSPITAL | Age: 61
End: 2021-05-10

## 2021-06-24 DIAGNOSIS — N39.41 URGE INCONTINENCE: ICD-10-CM

## 2021-06-24 RX ORDER — DARIFENACIN 15 MG/1
15 TABLET, EXTENDED RELEASE ORAL DAILY
Qty: 90 TABLET | Refills: 0 | Status: SHIPPED | OUTPATIENT
Start: 2021-06-24 | End: 2021-08-05 | Stop reason: SDUPTHER

## 2021-08-05 ENCOUNTER — OFFICE VISIT (OUTPATIENT)
Dept: UROLOGY | Facility: CLINIC | Age: 61
End: 2021-08-05
Payer: MEDICARE

## 2021-08-05 DIAGNOSIS — N39.41 URGE INCONTINENCE: ICD-10-CM

## 2021-08-05 DIAGNOSIS — N39.44 NOCTURNAL ENURESIS: Primary | ICD-10-CM

## 2021-08-05 PROCEDURE — 99214 PR OFFICE/OUTPT VISIT, EST, LEVL IV, 30-39 MIN: ICD-10-PCS | Mod: 95,,, | Performed by: UROLOGY

## 2021-08-05 PROCEDURE — 99214 OFFICE O/P EST MOD 30 MIN: CPT | Mod: 95,,, | Performed by: UROLOGY

## 2021-08-05 RX ORDER — DARIFENACIN 15 MG/1
15 TABLET, EXTENDED RELEASE ORAL DAILY
Qty: 90 TABLET | Refills: 3 | Status: SHIPPED | OUTPATIENT
Start: 2021-08-05 | End: 2022-08-29

## 2022-08-29 ENCOUNTER — PATIENT MESSAGE (OUTPATIENT)
Dept: UROLOGY | Facility: CLINIC | Age: 62
End: 2022-08-29
Payer: MEDICARE

## 2024-04-10 NOTE — TELEPHONE ENCOUNTER
I spoke Yesenia she's faxing the referral to Dr. Pierce office today for this patient.     [Dear  ___] : Dear  [unfilled], [Consult Letter:] : I had the pleasure of evaluating your patient, [unfilled]. [Please see my note below.] : Please see my note below. [Consult Closing:] : Thank you very much for allowing me to participate in the care of this patient.  If you have any questions, please do not hesitate to contact me. [Sincerely,] : Sincerely, [FreeTextEntry3] : Rene Calvo MD FCCP Pulmonary/Critical Care/Sleep Medicine Department of Internal Medicine  [facility]